# Patient Record
Sex: MALE | Race: WHITE | HISPANIC OR LATINO
[De-identification: names, ages, dates, MRNs, and addresses within clinical notes are randomized per-mention and may not be internally consistent; named-entity substitution may affect disease eponyms.]

---
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
## 2022-10-13 NOTE — PROCEDURE: IN-HOUSE DISPENSING PHARMACY
JAKE     Required Interventions and Feedback     Call Status         Call Status:     Answered, Attempt 1, Attempt 2, Attempt 3        SDOH - Issues List:     Transportation       Comments:     Patient states daughter cannot drive him all the time to appts. Wondering what is offered in his area. Will refer to SW.      SDOH - Actions Taken    Referral to Social Work:     Yes   
Product 26 Price/Unit (In Dollars): 0
Product 7 Unit Type: mg
Send Charges To Patient Encounter: No
Product 1 Units Dispensed: 1
Detail Level: Zone
Product 1 Amount/Unit (Numbers Only): 30
Product 1 Unit Type: ml
Product 1 Application Directions: Aaa QHS
Name Of Product 1: Acne Gel - adapalene 0.3%, Benzoyl Peroxide 2.5%, Niacinamide 4%